# Patient Record
Sex: MALE | Race: WHITE | NOT HISPANIC OR LATINO | URBAN - METROPOLITAN AREA
[De-identification: names, ages, dates, MRNs, and addresses within clinical notes are randomized per-mention and may not be internally consistent; named-entity substitution may affect disease eponyms.]

---

## 2017-05-03 ENCOUNTER — OFFICE VISIT - RIVER FALLS (OUTPATIENT)
Dept: FAMILY MEDICINE | Facility: CLINIC | Age: 22
End: 2017-05-03

## 2017-05-03 ASSESSMENT — MIFFLIN-ST. JEOR: SCORE: 2127.15

## 2020-04-24 ENCOUNTER — OFFICE VISIT - RIVER FALLS (OUTPATIENT)
Dept: FAMILY MEDICINE | Facility: CLINIC | Age: 25
End: 2020-04-24

## 2020-04-25 ENCOUNTER — NURSE TRIAGE (OUTPATIENT)
Dept: NURSING | Facility: CLINIC | Age: 25
End: 2020-04-25

## 2020-04-25 NOTE — TELEPHONE ENCOUNTER
[facility] - mom upset that writer doesn't have access to patient's chart. States she was informed that [employer] [facility] was starting covid testing today - writer is unable to confirm that and is unable to get [facility] to confirm either.    Julia Alicea, RN on 4/25/2020 at 12:25 PM    Reason for Disposition    [1] Caller requesting NON-URGENT health information AND [2] PCP's office is the best resource    Additional Information    Negative: Nursing judgment    Information only call; adult is not ill or injured    Negative: [1] Caller is not with the adult (patient) AND [2] reporting urgent symptoms    Negative: Lab result questions    Negative: Medication questions    Negative: Caller can't be reached by phone    Negative: Caller has already spoken to PCP or another triager    Negative: RN needs further essential information from caller in order to complete triage    Negative: Requesting regular office appointment    Protocols used: INFORMATION ONLY CALL-A-, NO GUIDELINE OR REFERENCE [id number]

## 2020-04-27 ENCOUNTER — AMBULATORY - RIVER FALLS (OUTPATIENT)
Dept: FAMILY MEDICINE | Facility: CLINIC | Age: 25
End: 2020-04-27

## 2020-04-27 ENCOUNTER — COMMUNICATION - RIVER FALLS (OUTPATIENT)
Dept: FAMILY MEDICINE | Facility: CLINIC | Age: 25
End: 2020-04-27

## 2022-02-12 VITALS
HEIGHT: 73 IN | HEART RATE: 92 BPM | WEIGHT: 243 LBS | DIASTOLIC BLOOD PRESSURE: 74 MMHG | TEMPERATURE: 99.1 F | BODY MASS INDEX: 32.2 KG/M2 | SYSTOLIC BLOOD PRESSURE: 126 MMHG

## 2022-02-15 NOTE — LETTER
(Inserted Image. Unable to display)   April 30, 2020      LILIAM JAY   [address] [place], [place] [id number]        Dear [facility],      Thank you for selecting [facility] for your healthcare needs.     Negative      Result Name Current Result Reference Range   Coronavirus SARS-CoV-2 (COVID-19)  NOT DETECTED 4/27/2020 NOT DETECTED -        Please contact me or my assistant at [phone] if you have any questions or concerns.     Sincerely,

## 2022-02-15 NOTE — NURSING NOTE
Comprehensive Intake Entered On:  4/24/2020 11:03 AM CDT    Performed On:  4/24/2020 11:01 AM CDT by Ivory Streeter               Summary   Chief Complaint :   Verbal consent given for telephone visit. Pt c/o paint thinner exposer yesterday at work and c/o red hands. Pt did not want work comp due to COVID-19 symptoms. C/o cough and fever starting today.    Ivory Streeter - 4/24/2020 11:01 AM CDT   Health Status   Allergies Verified? :   Yes   Medication History Verified? :   Yes   Medical History Verified? :   Yes   Pre-Visit Planning Status :   Completed   Tobacco Use? :   Never smoker   Ivory Streeter - 4/24/2020 11:01 AM CDT   Meds / Allergies   (As Of: 4/24/2020 11:03:08 AM CDT)   Allergies (Active)   No known allergies  Estimated Onset Date:   Unspecified ; Created By:   Generated Domain User for [id number]; Reaction Status:   Active ; Substance:   No known allergies ; Updated By:   Generated Domain User for [id number]; Reviewed Date:   4/24/2020 11:02 AM CDT        Medication List   (As Of: 4/24/2020 11:03:08 AM CDT)   Prescription/Discharge Order    triamcinolone topical  :   triamcinolone topical ; Status:   Completed ; Ordered As Mnemonic:   triamcinolone 0.1% topical cream ; Simple Display Line:   1 nahed, TOP, bid, for 14 day(s), 30 gm, 0 Refill(s) ; Ordering Provider:   Tone Ivy MD; Catalog Code:   triamcinolone topical ; Order Dt/Tm:   5/5/2017 8:00:14 AM CDT            Home Meds    ISOtretinoin  :   ISOtretinoin ; Status:   Completed ; Ordered As Mnemonic:   Accutane ; Simple Display Line:   po, bid, 0 Refill(s) ; Catalog Code:   ISOtretinoin ; Order Dt/Tm:   5/3/2017 4:03:05 PM CDT

## 2022-02-15 NOTE — PROGRESS NOTES
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    Source of history:  Patient.    Location of patient:  _ home  Call Start Time:   _ 1107  Call End Time:    _  1112      Chief Complaint   2020 11:01 AM CDT   Verbal consent given for telephone visit. Pt c/o paint thinner exposer yesterday at work and c/o red hands. Pt did not want work comp due to COVID-19 symptoms. C/o cough and fever starting today.     _      History of Present Illness   Today's visit was conducted via telephone due to the COVID-19 pandemic. Patient's consent to telephone visit was obtained and documented.      Reason for visit:  _    Patient calls because of exposure to hydrocarbons.  He worked with a lot of paint thinner yesterday he has had a rash on his hands from it.  He was wearing gloves on.  He has inhaled a lot of fumes and felt ill last night.  He had nausea vomited once had some loose stool.  Still does not feel great today.  No fevers only took his temperature at home.  No chest pain shortness of breath or cough.  No exposure to covert.  He does work in a restaurant.  He stayed home from work today.  Certainly his symptoms may be related to his hydrocarbon exposure.  He may have symptoms today.  If he is not back to normal tomorrow he is to stay home from work be still symptoms on Monday he needs to call the clinic to discuss possible testing for   Covid.  He will call if he is worse.         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     Plan:  as above.       Health Status   Allergies:    Allergic Reactions (Selected)  Severity Not Documented  No known allergies (No reactions were documented)   Medications:  (Selected)      Problem list:    All Problems  Acute anxiety / SNOMED CT 39799096 / Confirmed  Environmental allergy / SNOMED CT 7614218855 / Confirmed  Juvenile osteochondrosis of lower extremity, excluding foot / SNOMED CT 789470852 / Confirmed  Obesity, unspecified / SNOMED CT 9909355836 / Probable  Tobacco user / SNOMED CT 733486678 / Probable      Histories   Past Medical History:    Active  Juvenile osteochondrosis of lower extremity, excluding foot (966770092)  Obesity, unspecified (3785619755)  Resolved  History of chicken pox (473328779):  Resolved.   Family History:    No family history items have been selected or recorded.   Procedure history:    H/O shoulder surgery (SNOMED CT 66958AWV-[id number]).  Comments:  7/7/2016 12:09 PM CDT - Ben Navarro CMA  Left shoulder x 3   Social History:        Tobacco Assessment            Never smoker, Snuff                     Comments:                      07/07/2016 - Ben Navarro CMA                     1 tin per week

## 2022-02-15 NOTE — PROGRESS NOTES
Patient:   LILIAM JAY            MRN: [id number]            FIN: [id number]               Age:   21 years     Sex:  Male     :  1995   Associated Diagnoses:   None   Author:   Tone Ivy MD      Visit Information      Date of Service: 2017 03:55 pm  Performing Location: [facility]  Encounter#: [id number]      Primary Care Provider (PCP):  Toñito Schuster MD    NPI# [id number]      Referring Provider:  No referring provider recorded for selected visit.      Chief Complaint   5/3/2017 3:57 PM CDT     Pt c/o rash on both arms. Rash started on hands about 1 week ago.        History of Present Illness   CC as above and reviewed w  patient   Pt also complains of dry lips and has been on accutane for about 6 months, recently had dose increased.       Review of Systems   Constitutional:  No fever.    Gastrointestinal:  No vomiting, No diarrhea.    Musculoskeletal:  No muscle pain.             Health Status   Allergies:    Allergic Reactions (Selected)  Severity Not Documented  No known allergies (No reactions were documented)   Medications:  (Selected)   Documented Medications  Documented  Accutane: po, bid, 0 Refill(s), Type: Maintenance   Problem list:    All Problems (Selected)  Acute anxiety / SNOMED CT 39509463 / Confirmed  Environmental allergy / SNOMED CT 4022947619 / Confirmed  Juvenile osteochondrosis of lower extremity, excluding foot / SNOMED CT 578641905 / Confirmed  Obesity, unspecified / SNOMED CT 6246448734 / Probable  Tobacco user / SNOMED CT 051249488 / Probable      Histories   Past Medical History:    Active  Juvenile osteochondrosis of lower extremity, excluding foot (044971511)  Obesity, unspecified (1278811943)  Resolved  History of chicken pox (256092341):  Resolved.   Family History:    No family history items have been selected or recorded.   Procedure history:    H/O shoulder surgery ([id number]).  Comments:  2016 12:09 PM -  Ben Navarro CMA  Left shoulder x 3   Social History:        Tobacco Assessment            Never smoker, Snuff                     Comments:                      07/07/2016 - Ben Navarro CMA                     1 tin per week        Physical Examination   Skin - dry skin involving hands and arms b/l. Fairly extensive.  Lips - moderate cheilitis      Review / Management   Results review:  Lab results: 5/3/2017 4:32 PM CDT     KOH Preparation           No yeast or hyphae seen  .       Impression and Plan   Eczema   - likely related to recent dose increase in accutane. Recommended discussing with his dermatologist. Can use triamcinolone cream on the inflamed areas in additino to moisturization.

## 2022-02-15 NOTE — TELEPHONE ENCOUNTER
---------------------  From: Elizabeth Foster (Phone Messages Pool ([id number]_[facility]))   To: [facility] Message Pool ([id number]_[place]);     Sent: 5/1/2020 12:09:35 PM CDT  Subject: General Message     Phone Message    PCP: Ashlee    Time of Call: 1202    Phone Number: [phone]      Note: Patient called stating that he was tested for COVID and it came back negative. Patient stated that he is needing a work note stating that he was tested for COVID and it was negative. Patient states he can come by and pick it upI spoke to pt yesterday. He had asked that a lab letter be mailed, that his employer would be happy with that.  LVMTCB. Would like to know if the lab letter will no longer work or if he just need's to pick it up in clinic.Pt LM at 1:41pm stating that letter would be mailed to his parents address and he isn't sure if he will be able to get it. Pt says he has had no symptoms for 7 days. Pt asking for a work note explaining to his boss that he was sick/ in quarantine.    Pt would like to pick note up.Put note and lab results letter at the  for pickup on Monday.

## 2022-02-15 NOTE — TELEPHONE ENCOUNTER
---------------------  From: Cyndy Briggs CMA   Sent: 4/27/2020 5:01:07 PM CDT  Subject: [facility]     Reported PUI for COVID-19 to [facility]. Patient was tested by [facility] dariusz, checked 02 Sat=95%. Specimen sent through [facility] lab.